# Patient Record
Sex: MALE | Race: WHITE | Employment: PART TIME | URBAN - METROPOLITAN AREA
[De-identification: names, ages, dates, MRNs, and addresses within clinical notes are randomized per-mention and may not be internally consistent; named-entity substitution may affect disease eponyms.]

---

## 2020-02-01 ENCOUNTER — HOSPITAL ENCOUNTER (EMERGENCY)
Age: 30
Discharge: HOME | End: 2020-02-01
Payer: MEDICARE

## 2020-02-01 ENCOUNTER — APPOINTMENT (OUTPATIENT)
Dept: GENERAL RADIOLOGY | Age: 30
End: 2020-02-01
Payer: MEDICARE

## 2020-02-01 VITALS
BODY MASS INDEX: 25.86 KG/M2 | WEIGHT: 208 LBS | HEART RATE: 80 BPM | SYSTOLIC BLOOD PRESSURE: 138 MMHG | DIASTOLIC BLOOD PRESSURE: 84 MMHG | OXYGEN SATURATION: 100 % | RESPIRATION RATE: 18 BRPM | TEMPERATURE: 98 F | HEIGHT: 75 IN

## 2020-02-01 PROCEDURE — 73552 X-RAY EXAM OF FEMUR 2/>: CPT

## 2020-02-01 PROCEDURE — 73560 X-RAY EXAM OF KNEE 1 OR 2: CPT

## 2020-02-01 PROCEDURE — 6370000000 HC RX 637 (ALT 250 FOR IP): Performed by: PHYSICIAN ASSISTANT

## 2020-02-01 PROCEDURE — 72170 X-RAY EXAM OF PELVIS: CPT

## 2020-02-01 PROCEDURE — 73501 X-RAY EXAM HIP UNI 1 VIEW: CPT

## 2020-02-01 PROCEDURE — 99283 EMERGENCY DEPT VISIT LOW MDM: CPT

## 2020-02-01 RX ORDER — METHOCARBAMOL 500 MG/1
500 TABLET, FILM COATED ORAL ONCE
Status: COMPLETED | OUTPATIENT
Start: 2020-02-01 | End: 2020-02-01

## 2020-02-01 RX ORDER — ALBUTEROL SULFATE 0.63 MG/3ML
1 SOLUTION RESPIRATORY (INHALATION) EVERY 6 HOURS PRN
COMMUNITY

## 2020-02-01 RX ORDER — KETOROLAC TROMETHAMINE 30 MG/ML
15 INJECTION, SOLUTION INTRAMUSCULAR; INTRAVENOUS ONCE
Status: DISCONTINUED | OUTPATIENT
Start: 2020-02-01 | End: 2020-02-01

## 2020-02-01 RX ORDER — IBUPROFEN 600 MG/1
600 TABLET ORAL ONCE
Status: COMPLETED | OUTPATIENT
Start: 2020-02-01 | End: 2020-02-01

## 2020-02-01 RX ORDER — HYDROCODONE BITARTRATE AND ACETAMINOPHEN 5; 325 MG/1; MG/1
1 TABLET ORAL EVERY 6 HOURS PRN
Qty: 6 TABLET | Refills: 0 | Status: SHIPPED | OUTPATIENT
Start: 2020-02-01 | End: 2020-02-04

## 2020-02-01 RX ORDER — FLUTICASONE PROPIONATE 50 MCG
1 SPRAY, SUSPENSION (ML) NASAL DAILY
COMMUNITY

## 2020-02-01 RX ORDER — TRAMADOL HYDROCHLORIDE 50 MG/1
50 TABLET ORAL EVERY 6 HOURS PRN
COMMUNITY

## 2020-02-01 RX ORDER — HYDROCODONE BITARTRATE AND ACETAMINOPHEN 5; 325 MG/1; MG/1
1 TABLET ORAL ONCE
Status: COMPLETED | OUTPATIENT
Start: 2020-02-01 | End: 2020-02-01

## 2020-02-01 RX ORDER — MECLIZINE HCL 12.5 MG/1
12.5 TABLET ORAL 3 TIMES DAILY PRN
COMMUNITY

## 2020-02-01 RX ORDER — IBUPROFEN 600 MG/1
600 TABLET ORAL EVERY 6 HOURS PRN
Qty: 20 TABLET | Refills: 0 | Status: SHIPPED | OUTPATIENT
Start: 2020-02-01 | End: 2020-03-02

## 2020-02-01 RX ORDER — METHOCARBAMOL 500 MG/1
500 TABLET, FILM COATED ORAL 4 TIMES DAILY
Qty: 40 TABLET | Refills: 0 | Status: SHIPPED | OUTPATIENT
Start: 2020-02-01 | End: 2020-02-11

## 2020-02-01 RX ADMIN — METHOCARBAMOL TABLETS 500 MG: 500 TABLET, COATED ORAL at 16:17

## 2020-02-01 RX ADMIN — HYDROCODONE BITARTRATE AND ACETAMINOPHEN 1 TABLET: 5; 325 TABLET ORAL at 16:17

## 2020-02-01 RX ADMIN — IBUPROFEN 600 MG: 600 TABLET, FILM COATED ORAL at 16:17

## 2020-02-01 ASSESSMENT — PAIN SCALES - GENERAL
PAINLEVEL_OUTOF10: 10

## 2020-02-01 NOTE — ED PROVIDER NOTES
EMERGENCY DEPARTMENT ENCOUNTER      PCP: No primary care provider on file. CHIEF COMPLAINT    Chief Complaint   Patient presents with    Knee Pain     fall in yard carrying a TV    Other     small abrasion to neck and right forearm         This patient was not evaluated by the attending physician. I have independently evaluated this patient . MAXWELL Moore is a 30 y.o. male who presents the emergency department today via EMS with a chief complaint of right upper leg/knee pain. He states that he was carrying a heavy TV through her yard when he stepped in a well causing him to lose his balance. He states in the course of falling he injured his hip flexor/quadriceps into his knee. He is unsure of the exact mechanism of injury. Has yet been able to bear weight since the injury. He states he has had ongoing knee issues, he states he has a history of meniscal injury that has been evaluated for. No hip pain. No low back pain. Locates the majority of his pain into the mid femur area near the hip flexor. Is able to extend at the knee and flex. No distal numbness tingling or weakness, no other pain. REVIEW OF SYSTEMS    General: Denies constitutional symptoms. Cardiac: Denies chest wall injury or chest pain  Pulmonary: Denies chest wall injury or shortness of breath  GI: Denies abdomen injury or abdominal pain  Musculoskeletal:  Denies any other musculoskeletal pain or injuries, including chest wall and back. Skin: Has an abrasion to the anterior nape of the neck, right forearm. Lymphatics:  No nodules or streaks. See HPI and nursing notes for additional information     PAST MEDICAL & SURGICAL HISTORY    No past medical history on file. No past surgical history on file.     CURRENT MEDICATIONS    Current Outpatient Rx   Medication Sig Dispense Refill    sertraline (ZOLOFT) 50 MG tablet Take 50 mg by mouth daily      traMADol (ULTRAM) 50 MG tablet Take 50 mg by mouth every 6 hours as needed for Pain.  Cetirizine HCl (ZYRTEC PO) Take by mouth      meclizine (ANTIVERT) 12.5 MG tablet Take 12.5 mg by mouth 3 times daily as needed      fluticasone (FLONASE) 50 MCG/ACT nasal spray 1 spray by Each Nostril route daily      albuterol (ACCUNEB) 0.63 MG/3ML nebulizer solution Take 1 ampule by nebulization every 6 hours as needed for Wheezing         ALLERGIES    No Known Allergies    SOCIAL & FAMILY HISTORY    Social History     Socioeconomic History    Marital status: Legally      Spouse name: Not on file    Number of children: Not on file    Years of education: Not on file    Highest education level: Not on file   Occupational History    Not on file   Social Needs    Financial resource strain: Not on file    Food insecurity:     Worry: Not on file     Inability: Not on file    Transportation needs:     Medical: Not on file     Non-medical: Not on file   Tobacco Use    Smoking status: Not on file   Substance and Sexual Activity    Alcohol use: Not on file    Drug use: Not on file    Sexual activity: Not on file   Lifestyle    Physical activity:     Days per week: Not on file     Minutes per session: Not on file    Stress: Not on file   Relationships    Social connections:     Talks on phone: Not on file     Gets together: Not on file     Attends Yazidi service: Not on file     Active member of club or organization: Not on file     Attends meetings of clubs or organizations: Not on file     Relationship status: Not on file    Intimate partner violence:     Fear of current or ex partner: Not on file     Emotionally abused: Not on file     Physically abused: Not on file     Forced sexual activity: Not on file   Other Topics Concern    Not on file   Social History Narrative    Not on file     No family history on file.         PHYSICAL EXAM    VITAL SIGNS: BP (!) 138/94   Pulse 78   Temp 98 °F (36.7 °C) (Oral)   Resp 18   Ht 6' 3\" (1.905 m)   Wt 208 lb (94.3 kg)   SpO2 97%   BMI 26.00 kg/m²   Constitutional:  Well developed, well nourished, no acute distress, non-toxic appearance   HENT:  Atraumatic    Musculoskeletal:   On inspection his leg is in the externally rotated position, he is able to flex and extend at the hip joint down, demonstrates mild internal/external flexion secondary to pain. Is able to extend the knee, flex. He is neurovascular intact with strong dorsalis pedis pulse appropriate capillary refill, DTRs within normal limits. Extensor mechanism within normal limits. Examination of remaining extremities reveals active ROM of  joints without ligamentous laxity. Theres no obvious joint or bony deformity. Lymphatics:  No swollen nodes or streaks. Integument:  Well hydrated, no rash. s abrasion to the right forearm and the anterior nape of the neck. Vascular: affected extremity distally neurovascularly intact - pulses, sensation, and capillary refill intact. Neurologic:  Awake alert, normal flow of speech. Cranial nerves II through XII grossly intact. Psychiatric: Cooperative, pleasant affect    RADIOLOGY/PROCEDURES    Xr Pelvis (1-2 Views)    Result Date: 2/1/2020  EXAMINATION: 2 XRAY VIEWS OF THE RIGHT KNEE; 2 XRAY VIEWS OF THE RIGHT FEMUR; 1 XRAY VIEW OF THE PELVIS 2/1/2020 4:50 pm COMPARISON: None. HISTORY: Acute pain status post fall. FINDINGS: Bony pelvis is intact without evidence of fracture or dislocation. Mild degenerative changes of the lower lumbar spine. No acute fracture or dislocation of the right hip. No acute fracture or dislocation of the right knee. No significant degenerative changes or joint effusion. Soft tissues are unremarkable. No acute osseous abnormality     Xr Femur Right (min 2 Views)    Result Date: 2/1/2020  EXAMINATION: 2 XRAY VIEWS OF THE RIGHT KNEE; 2 XRAY VIEWS OF THE RIGHT FEMUR; 1 XRAY VIEW OF THE PELVIS 2/1/2020 4:50 pm COMPARISON: None. HISTORY: Acute pain status post fall.  FINDINGS: Bony

## 2020-03-19 ENCOUNTER — HOSPITAL ENCOUNTER (EMERGENCY)
Age: 30
Discharge: HOME | End: 2020-03-19
Payer: MEDICARE

## 2020-03-19 VITALS
HEART RATE: 62 BPM | BODY MASS INDEX: 24.99 KG/M2 | SYSTOLIC BLOOD PRESSURE: 114 MMHG | DIASTOLIC BLOOD PRESSURE: 81 MMHG | WEIGHT: 201 LBS | RESPIRATION RATE: 18 BRPM | TEMPERATURE: 98.5 F | HEIGHT: 75 IN | OXYGEN SATURATION: 98 %

## 2020-03-19 PROCEDURE — 99283 EMERGENCY DEPT VISIT LOW MDM: CPT

## 2020-03-19 NOTE — ED PROVIDER NOTES
EMERGENCY DEPARTMENT ENCOUNTER      PCP: JUAN MIGUEL George    CHIEF COMPLAINT    Chief Complaint   Patient presents with    Other     sent by  for COVID testing. This patient was not evaluated by the attending physician. I have independently evaluated this patient. HPI    Shailesh Bullard is a 30 y.o. male who presents with productive cough. Onset was prior to travel, x2 days. Context is patient denies any recent travel or sick contacts. No associated fever or chills. States cough is productive with yellow sputum without associated chest pain or shortness of breath. No other nasal congestion, sore throat or ear pain. Denying any abdominal or urinary complaints.  was seen by local PCP today for symptoms and was told to come into the emergency department for  Covid testing. Patient himself states that he does has not had recent contacts that have tested positive for COVID-19 and denies travel to high risk countries for COVID-19. He does state that he brought in a prescription from family doctor for testing and states \"[facility].\"      REVIEW OF SYSTEMS    Constitutional:  No fever, chills  HEENT:  See HPI  Cardiovascular:  Denies chest pain, palpitations. Respiratory:  See HPI  GI:  Denies abdominal pain, vomiting, or diarrhea   Neurologic:  Denies confusion, light headedness, dizziness, or syncope   Skin:  No rash    All other review of systems are negative  See HPI and nursing notes for additional information       PAST MEDICAL AND SURGICAL HISTORY    No past medical history on file. No past surgical history on file.     CURRENT MEDICATIONS    Current Outpatient Rx   Medication Sig Dispense Refill    Pseudoephedrine-DM-GG 60- MG TABS Take 1 tablet by mouth every 6 hours 20 tablet 0    sertraline (ZOLOFT) 50 MG tablet Take 50 mg by mouth daily      traMADol (ULTRAM) 50 MG tablet Take 50 mg by mouth every 6 hours as needed for Pain.  Cetirizine HCl (ZYRTEC PO) Take by mouth      meclizine (ANTIVERT) 12.5 MG tablet Take 12.5 mg by mouth 3 times daily as needed      fluticasone (FLONASE) 50 MCG/ACT nasal spray 1 spray by Each Nostril route daily      albuterol (ACCUNEB) 0.63 MG/3ML nebulizer solution Take 1 ampule by nebulization every 6 hours as needed for Wheezing      ibuprofen (IBU) 600 MG tablet Take 1 tablet by mouth every 6 hours as needed for Pain 20 tablet 0       ALLERGIES    No Known Allergies    FAMILY AND SOCIAL HISTORY    No family history on file.   Social History     Socioeconomic History    Marital status: Legally      Spouse name: Not on file    Number of children: Not on file    Years of education: Not on file    Highest education level: Not on file   Occupational History    Not on file   Social Needs    Financial resource strain: Not on file    Food insecurity     Worry: Not on file     Inability: Not on file    Transportation needs     Medical: Not on file     Non-medical: Not on file   Tobacco Use    Smoking status: Never Smoker    Smokeless tobacco: Current User     Types: Chew   Substance and Sexual Activity    Alcohol use: Yes     Comment: occasionally    Drug use: Never    Sexual activity: Not on file   Lifestyle    Physical activity     Days per week: Not on file     Minutes per session: Not on file    Stress: Not on file   Relationships    Social connections     Talks on phone: Not on file     Gets together: Not on file     Attends Roman [employer] service: Not on file     Active member of club or organization: Not on file     Attends meetings of clubs or organizations: Not on file     Relationship status: Not on file    Intimate partner violence     Fear of current or ex partner: Not on file     Emotionally abused: Not on file     Physically abused: Not on file     Forced sexual activity: Not on file   Other Topics Concern    Not on file   Social History Narrative    Not on file 55-year-old male in no acute respiratory distress or increased work of breathing. Vitals are stable and he is afebrile. HEENT exam is unremarkable. Lungs are clear to auscultation. If in length of symptoms, discussed likely viral etiology of patient's URI cough and cold complaints. Do not feel additional labs or imaging is indicated. He otherwise has low risk factors for Covid infection given no recent positive contacts or travel however will be provided contact information for local [facility] outpatient testing clinic as needed. I discussed the likely viral etiology of patient's symptoms with patient today and recommend symptomatic treatment with increase fluids and rest. I considered the possibility of COVID19 in light of the current available information. The patient is low risk for mortality based on demographic, history, and clinical factors. Specific COVID19 testing is not available or not approved in this patient. Given the best available information and clinical assessment, I estimate the risk of hospitalization to be greater than the risk of treatment at home. I have explained to the patient that their condition may change rapidly and have given them strict return precautions. In addition, they are given instructions regarding appropriate symptomatic care at home and instructions on how to minimize spread of the infection. Out of an abundance of caution I advised patient and household members to self quarantine for 14 days or until patient is asymptomati. I prescribed patient oral nasal decongestion/antitussive medication- we discussed medications. **I have elected to avoid steroids in this patient and have educated patient/family to avoid motrin in fever for possible covid-19 infection. Patient and family also educated to avoid nebulized home treatments -we will continue the prescribed MDI inhaler treatments given by PCP today.    I have low clinical suspicion for ARDS, pneumonia

## 2020-03-19 NOTE — ED NOTES
Discharge instructions and prescriptions reviewed with patient. Voiced understanding.      [address], ELIZABETH  03/19/20 0076